# Patient Record
Sex: FEMALE | Race: WHITE | URBAN - METROPOLITAN AREA
[De-identification: names, ages, dates, MRNs, and addresses within clinical notes are randomized per-mention and may not be internally consistent; named-entity substitution may affect disease eponyms.]

---

## 2024-04-22 ENCOUNTER — OV CON (OUTPATIENT)
Dept: URBAN - METROPOLITAN AREA CLINIC 115 | Facility: CLINIC | Age: 64
End: 2024-04-22
Payer: COMMERCIAL

## 2024-04-22 VITALS
TEMPERATURE: 97.2 F | HEIGHT: 63 IN | HEART RATE: 69 BPM | WEIGHT: 168.4 LBS | DIASTOLIC BLOOD PRESSURE: 82 MMHG | BODY MASS INDEX: 29.84 KG/M2 | SYSTOLIC BLOOD PRESSURE: 125 MMHG

## 2024-04-22 DIAGNOSIS — K62.5 RECTAL BLEEDING: ICD-10-CM

## 2024-04-22 DIAGNOSIS — R19.5 POSITIVE COLORECTAL CANCER SCREENING USING COLOGUARD TEST: ICD-10-CM

## 2024-04-22 DIAGNOSIS — R10.11 POSTPRANDIAL RUQ PAIN: ICD-10-CM

## 2024-04-22 DIAGNOSIS — R12 HEARTBURN: ICD-10-CM

## 2024-04-22 PROBLEM — 12063002: Status: ACTIVE | Noted: 2024-04-22

## 2024-04-22 PROBLEM — 16331000: Status: ACTIVE | Noted: 2024-04-22

## 2024-04-22 PROBLEM — 301717006: Status: ACTIVE | Noted: 2024-04-22

## 2024-04-22 PROBLEM — 305058001: Status: ACTIVE | Noted: 2024-04-22

## 2024-04-22 PROBLEM — 708699002: Status: ACTIVE | Noted: 2024-04-22

## 2024-04-22 PROCEDURE — 99203 OFFICE O/P NEW LOW 30 MIN: CPT

## 2024-04-22 PROCEDURE — 99243 OFF/OP CNSLTJ NEW/EST LOW 30: CPT

## 2024-04-22 RX ORDER — LISINOPRIL AND HYDROCHLOROTHIAZIDE 10; 12.5 MG/1; MG/1
1 TABLET TABLET ORAL ONCE A DAY
Status: ACTIVE | COMMUNITY

## 2024-04-22 RX ORDER — ALPRAZOLAM 0.5 MG/1
1 TABLET TABLET ORAL TWICE A DAY
Status: ACTIVE | COMMUNITY

## 2024-04-22 NOTE — HPI-TODAY'S VISIT:
63 y/o F with PMH of HTN, HLD, preDM, anxiety presents on referral from Abigail GUARDADO with c/o positive cologuard. A copy of this note will be sent to referring provider.  Denies PMH of MI, stroke, seizures, BiPAP use, pacemaker/defibrillator, blood thinners, ESRD.  States she has recently started having RUQ pain x3-4 weeks, intermittently; triggered by eating but unknown food triggers. Also reports one day of BRBPR 2 weeks ago. BM frequency is qd-BID. Reports some heartburn related to certain foods (Mexican, Sami, spicy foods).  Denies n/v, diarrhea, constipation, dysphagia, odynophagia, unintentional weight loss, change in appetite, early satiety. Takes ibuprofen 1-2x a week, for body aches. Reports EtOH (~6x a yr). Denies tobacco/marijuana use.  Has been doing cologuard and this is the first time it's come back positive. Labs from 3/25 showed normal CBC, CMP, TSH.

## 2024-04-22 NOTE — PHYSICAL EXAM GASTROINTESTINAL
Abdomen , soft, nontender, nondistended , no guarding or rigidity , no masses palpable, neg Blackburn's sign

## 2024-06-03 ENCOUNTER — OFFICE VISIT (OUTPATIENT)
Dept: URBAN - METROPOLITAN AREA CLINIC 114 | Facility: CLINIC | Age: 64
End: 2024-06-03
Payer: COMMERCIAL

## 2024-06-03 DIAGNOSIS — K76.0 FATTY (CHANGE OF) LIVER: ICD-10-CM

## 2024-06-03 DIAGNOSIS — N28.1 KIDNEY CYST, ACQUIRED: ICD-10-CM

## 2024-06-03 PROCEDURE — 76705 ECHO EXAM OF ABDOMEN: CPT | Performed by: INTERNAL MEDICINE

## 2024-06-04 ENCOUNTER — OFFICE VISIT (OUTPATIENT)
Dept: URBAN - METROPOLITAN AREA SURGERY CENTER 13 | Facility: SURGERY CENTER | Age: 64
End: 2024-06-04
Payer: COMMERCIAL

## 2024-06-04 DIAGNOSIS — K62.89 OTHER SPECIFIED DISEASES OF ANUS AND RECTUM: ICD-10-CM

## 2024-06-04 DIAGNOSIS — K62.89 HYPERTROPHIED ANAL PAPILLA: ICD-10-CM

## 2024-06-04 DIAGNOSIS — D12.5 ADENOMA OF SIGMOID COLON: ICD-10-CM

## 2024-06-04 DIAGNOSIS — R19.5 OTHER FECAL ABNORMALITIES: ICD-10-CM

## 2024-06-04 DIAGNOSIS — Z12.11 COLON CANCER SCREENING: ICD-10-CM

## 2024-06-04 DIAGNOSIS — Z12.11 ENCOUNTER FOR SCREENING FOR MALIGNANT NEOPLASM OF COLON: ICD-10-CM

## 2024-06-04 DIAGNOSIS — K63.89 OTHER SPECIFIED DISEASES OF INTESTINE: ICD-10-CM

## 2024-06-04 PROCEDURE — 00812 ANES LWR INTST SCR COLSC: CPT | Performed by: ANESTHESIOLOGY

## 2024-06-04 PROCEDURE — 00812 ANES LWR INTST SCR COLSC: CPT | Performed by: ANESTHESIOLOGIST ASSISTANT

## 2024-06-04 PROCEDURE — 45385 COLONOSCOPY W/LESION REMOVAL: CPT | Performed by: INTERNAL MEDICINE

## 2024-06-04 PROCEDURE — 45380 COLONOSCOPY AND BIOPSY: CPT | Performed by: INTERNAL MEDICINE

## 2024-06-04 PROCEDURE — G8907 PT DOC NO EVENTS ON DISCHARG: HCPCS | Performed by: INTERNAL MEDICINE

## 2024-06-04 RX ORDER — ALPRAZOLAM 0.5 MG/1
1 TABLET TABLET ORAL TWICE A DAY
Status: ACTIVE | COMMUNITY

## 2024-06-04 RX ORDER — LISINOPRIL AND HYDROCHLOROTHIAZIDE 10; 12.5 MG/1; MG/1
1 TABLET TABLET ORAL ONCE A DAY
Status: ACTIVE | COMMUNITY

## 2024-06-28 ENCOUNTER — OFFICE VISIT (OUTPATIENT)
Dept: URBAN - METROPOLITAN AREA CLINIC 115 | Facility: CLINIC | Age: 64
End: 2024-06-28
Payer: COMMERCIAL

## 2024-06-28 ENCOUNTER — DASHBOARD ENCOUNTERS (OUTPATIENT)
Age: 64
End: 2024-06-28

## 2024-06-28 VITALS
BODY MASS INDEX: 30.33 KG/M2 | TEMPERATURE: 96.9 F | HEART RATE: 93 BPM | DIASTOLIC BLOOD PRESSURE: 82 MMHG | SYSTOLIC BLOOD PRESSURE: 124 MMHG | HEIGHT: 63 IN | WEIGHT: 171.2 LBS

## 2024-06-28 DIAGNOSIS — K76.0 FATTY LIVER DISEASE, NONALCOHOLIC: ICD-10-CM

## 2024-06-28 DIAGNOSIS — D12.6 TUBULAR ADENOMA OF COLON: ICD-10-CM

## 2024-06-28 DIAGNOSIS — K64.8 INTERNAL HEMORRHOIDS: ICD-10-CM

## 2024-06-28 DIAGNOSIS — K57.30 DIVERTICULA, COLON: ICD-10-CM

## 2024-06-28 DIAGNOSIS — R12 HEARTBURN: ICD-10-CM

## 2024-06-28 PROBLEM — 90458007: Status: ACTIVE | Noted: 2024-06-28

## 2024-06-28 PROBLEM — 733657002: Status: ACTIVE | Noted: 2024-06-28

## 2024-06-28 PROBLEM — 444898006: Status: ACTIVE | Noted: 2024-06-28

## 2024-06-28 PROBLEM — 1231824009: Status: ACTIVE | Noted: 2024-06-28

## 2024-06-28 PROCEDURE — 99213 OFFICE O/P EST LOW 20 MIN: CPT

## 2024-06-28 RX ORDER — ALPRAZOLAM 0.5 MG/1
1 TABLET TABLET ORAL TWICE A DAY
Status: ACTIVE | COMMUNITY

## 2024-06-28 RX ORDER — LISINOPRIL AND HYDROCHLOROTHIAZIDE 10; 12.5 MG/1; MG/1
1 TABLET TABLET ORAL ONCE A DAY
Status: ACTIVE | COMMUNITY

## 2024-06-28 NOTE — HPI-TODAY'S VISIT:
65 y/o F with PMH of HTN, HLD, preDM, anxiety presents on referral from Abigail GUARDADO with c/o positive cologuard. A copy of this note will be sent to referring provider.  4/22/24: Denies PMH of MI, stroke, seizures, BiPAP use, pacemaker/defibrillator, blood thinners, ESRD.  States she has recently started having RUQ pain x3-4 weeks, intermittently; triggered by eating but unknown food triggers. Also reports one day of BRBPR 2 weeks ago. BM frequency is qd-BID. Reports some heartburn related to certain foods (Mexican, Sami, spicy foods).  Denies n/v, diarrhea, constipation, dysphagia, odynophagia, unintentional weight loss, change in appetite, early satiety. Takes ibuprofen 1-2x a week, for body aches. Reports EtOH (~6x a yr). Denies tobacco/marijuana use.  Has been doing cologuard and this is the first time it's come back positive. Labs from 3/25 showed normal CBC, CMP, TSH. 6/28/24: COL with Dr. Be showed sigmoid diverticula, int hemorrhoids, hypertrophied anal papillae, one 20mm TA with features of torsion polyp in sigmoid, one polypoid lesion in IC valve; repeat in 1Y. States she has done 4-5 cologuards before. RUQ US showed fatty liver, R kidney cyst only. Today she states her heartburn is controlled. States she only gets sx when eating spicy foods. RUQ pain is resolved since colon prep.  BRBPR is resolved.

## 2024-12-27 ENCOUNTER — OFFICE VISIT (OUTPATIENT)
Dept: URBAN - METROPOLITAN AREA CLINIC 115 | Facility: CLINIC | Age: 64
End: 2024-12-27